# Patient Record
(demographics unavailable — no encounter records)

---

## 2024-10-22 NOTE — PHYSICAL EXAM

## 2024-10-22 NOTE — HISTORY OF PRESENT ILLNESS
[Mother] : mother [Normal] : Normal [Brushing teeth] : Brushing teeth [Yes] : Patient goes to dentist yearly [Vitamin] : Primary Fluoride Source: Vitamin [Playtime (60 min/d)] : Playtime 60 min a day [Appropiate parent-child-sibling interaction] : Appropriate parent-child-sibling interaction [Parent has appropriate responses to behavior] : Parent has appropriate responses to behavior [Adequate performance] : Adequate performance [No] : Not at  exposure [Up to date] : Up to date [de-identified] : Eating well overall. [de-identified] : Anticipatory Guidance Provided [FreeTextEntry1] : PT HERE FOR 5 YR [place] - NO SHOTS DOING WELL OVERALL   OAE- PASS L + R VISION- PHOTOSCREEN NO RISKS  UA- ALL NORMAL

## 2024-10-22 NOTE — DISCUSSION/SUMMARY
[Normal Growth] : growth [Normal Development] : development  [No Elimination Concerns] : elimination [Continue Regimen] : feeding [No Skin Concerns] : skin [Normal Sleep Pattern] : sleep [None] : no medical problems [Anticipatory Guidance Given] : Anticipatory guidance addressed as per the history of present illness section [No Vaccines] : no vaccines needed [Parent/Guardian] : Parent/Guardian [No Medications] : ~He/She~ is not on any medications [FreeTextEntry1] : Counseled fully. PREWORK: Reviewed prior notes, reports, and results. Independent historian; parent.  Lead screen: Negative. RBW 2023.  Continue balanced diet with all food groups. Brush teeth twice a day with toothbrush. Recommend visit to dentist. As per car seat 's guidelines, use foward-facing booster seat until child reaches highest weight/height for seat. Child needs to ride in a belt-positioning booster seat until  4 feet 9 inches has been reached and are between 8 and 12 years of age. Put child to sleep in own bed. Help child to maintain consistent daily routines and sleep schedule.  discussed. Ensure home is safe. Teach child about personal safety. Use consistent, positive discipline. Read aloud to child. Limit screen time to no more than 2 hours per day. Return 1 year for routine well child check.

## 2024-10-22 NOTE — DEVELOPMENTAL MILESTONES
[Normal Development] : Normal Development [None] : none [Goes to the bathroom independently] : goes to bathroom independently [Counts 5 objects] : counts 5 objects [Walks on tiptoes when asked] : walks on tiptoes when asked

## 2024-11-18 NOTE — DISCUSSION/SUMMARY
[FreeTextEntry1] : Counseled fully. PREWORK: Reviewed prior notes, reports, and results. Independent historian; parent.  Patient presents with parent for sick visit c/o fevers up to 102.7F and pain on left lower side per patient.  100 percent physician-engaged. Full ventilation. [facility] endorsed Isolation precautions. PPE in use. Rec COVID testing at home for household risk.  Advised to continue monitoring temperature and treat PRN with Tylenol or Motrin.  Ensure adequate hydration with Pedialyte or Gatorade. Keep patient comfortable.  Will be contagious until 24hrs fever free.   REC CONTINUE WITH SUPPORTIVE CARE. F/u if fever persist by end of week.

## 2024-11-18 NOTE — HISTORY OF PRESENT ILLNESS
[FreeTextEntry6] : FEVER LAST NIGHT 102.7  SLIGHT PAIN ON LOWER LEFT SIDE  LAST WEEK WENT HOME FROM SCHOOL  TEMP SLEEPING WELL / EATING WELL

## 2024-11-18 NOTE — DISCUSSION/SUMMARY
[FreeTextEntry1] : Counseled fully. PREWORK: Reviewed prior notes, reports, and results. Independent historian; parent.  Patient presents with parent for sick visit c/o fevers up to 102.7F and pain on left lower side per patient.  100 percent physician-engaged. Full ventilation. [facility] endorsed Isolation precautions. PPE in use. Rec COVID testing at home for household risk.  Advised to continue monitoring temperature and treat PRN with Tylenol or Motrin.  Ensure adequate hydration with Pedialyte or Gatorade. Keep patient comfortable.  Will be contagious until 24hrs fever free.   REC CONTINUE WITH SUPPORTIVE CARE. F/u if fever persist by end of week.

## 2025-03-12 NOTE — DISCUSSION/SUMMARY
[FreeTextEntry1] : rapid strep pos. throat cx pending  rash may be 2/2 scarlet fever vs parvovirus vs dehydration  In order to maintain hydration consume "oral rehydration solution," such as Pedialyte or low calorie sports drinks. If vomiting, try to give child a few teaspoons of fluid every few minutes. Avoid drinking juice or soda. These can make diarrhea worse. If tolerating solids, its best to consume lean meats, fruits, vegetables, and whole-grain breads and cereals. Avoid eating foods with a lot of fat or sugar, which can make symptoms worse.  if not urinating at least 4 times in 24hrs , go to ED for IVF   independent historian parent  Records reviewed. Fully counseled. All questions and concerns addressed.

## 2025-03-12 NOTE — HISTORY OF PRESENT ILLNESS
[FreeTextEntry6] : SUNDAY NIGHT STARTED THROWING UP 8:30 PM - ALL THROUGH THE NIGHT  MONDAY SLEPT MOST OF THE DAY / ATE AT NIGHT  SKIN WAS BLOTCHY / ONCE SHE THREW UP WENT AWAY  WARMER THAN SHE HAS BEEN - NO HIGH FEVERS   diarrhea for 2 days  urinated only once today.

## 2025-03-12 NOTE — PHYSICAL EXAM
[Erythematous Oropharynx] : erythematous oropharynx [NL] : soft, nontender, nondistended, normal bowel sounds, no hepatosplenomegaly [FreeTextEntry1] : skin hydrated  [de-identified] : strawberry tongue [de-identified] : lacy rash on arms